# Patient Record
Sex: FEMALE | Race: WHITE | NOT HISPANIC OR LATINO | Employment: STUDENT | URBAN - NONMETROPOLITAN AREA
[De-identification: names, ages, dates, MRNs, and addresses within clinical notes are randomized per-mention and may not be internally consistent; named-entity substitution may affect disease eponyms.]

---
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
## 2024-11-11 ENCOUNTER — OFFICE VISIT (OUTPATIENT)
Dept: UROLOGY | Facility: CLINIC | Age: 19
End: 2024-11-11
Payer: COMMERCIAL

## 2024-11-11 VITALS
HEIGHT: 67 IN | SYSTOLIC BLOOD PRESSURE: 107 MMHG | BODY MASS INDEX: 30.76 KG/M2 | HEART RATE: 58 BPM | WEIGHT: 196 LBS | DIASTOLIC BLOOD PRESSURE: 67 MMHG

## 2024-11-11 DIAGNOSIS — R35.0 FREQUENCY OF MICTURITION: Primary | ICD-10-CM

## 2024-11-11 DIAGNOSIS — N30.20 CHRONIC CYSTITIS: ICD-10-CM

## 2024-11-11 LAB
BILIRUB BLD-MCNC: NEGATIVE MG/DL
CLARITY, POC: CLEAR
COLOR UR: YELLOW
EXPIRATION DATE: ABNORMAL
GLUCOSE UR STRIP-MCNC: NEGATIVE MG/DL
KETONES UR QL: NEGATIVE
LEUKOCYTE EST, POC: ABNORMAL
Lab: ABNORMAL
NITRITE UR-MCNC: NEGATIVE MG/ML
PH UR: 6 [PH] (ref 5–8)
PROT UR STRIP-MCNC: ABNORMAL MG/DL
RBC # UR STRIP: NEGATIVE /UL
SP GR UR: 1.02 (ref 1–1.03)
UROBILINOGEN UR QL: NORMAL

## 2024-11-11 PROCEDURE — 87086 URINE CULTURE/COLONY COUNT: CPT | Performed by: UROLOGY

## 2024-11-11 PROCEDURE — 99203 OFFICE O/P NEW LOW 30 MIN: CPT | Performed by: UROLOGY

## 2024-11-11 PROCEDURE — 81003 URINALYSIS AUTO W/O SCOPE: CPT | Performed by: UROLOGY

## 2024-11-11 RX ORDER — ARIPIPRAZOLE 2 MG/1
1 TABLET ORAL DAILY
COMMUNITY
Start: 2024-11-07

## 2024-11-11 RX ORDER — POLYETHYLENE GLYCOL 3350 17 G/17G
POWDER, FOR SOLUTION ORAL
COMMUNITY
Start: 2024-07-25

## 2024-11-11 RX ORDER — OXCARBAZEPINE 150 MG/1
1 TABLET, FILM COATED ORAL EVERY 12 HOURS SCHEDULED
COMMUNITY
Start: 2024-09-26

## 2024-11-11 NOTE — PROGRESS NOTES
Chief Complaint:      Chief Complaint   Patient presents with    bladder wall thickening        HPI:   19 y.o. female furred for evaluation of bladder wall thickening.  She has a positive urine culture.  She has frequency and urgency.  No history of stones.  Apparently this x-ray was done at [facility] and I do not have access to the films set her up for lower tract investigation.    Past Medical History:     Past Medical History:   Diagnosis Date    Anxiety     Depression     PTSD (post-traumatic stress disorder)     Substance abuse     Suicide attempt        Current Meds:     Current Outpatient Medications   Medication Sig Dispense Refill    ARIPiprazole (ABILIFY) 2 MG tablet Take 1 tablet by mouth Daily.      buPROPion XL (WELLBUTRIN XL) 300 MG 24 hr tablet Take 1 tablet by mouth Every Morning. 30 tablet 0    mirtazapine (REMERON) 7.5 MG tablet Take 1 tablet by mouth Every Night. 30 tablet 0    ondansetron ODT (ZOFRAN-ODT) 4 MG disintegrating tablet Place 1 tablet on the tongue Every 6 (Six) Hours As Needed for Nausea or Vomiting. 12 tablet 0    OXcarbazepine (TRILEPTAL) 150 MG tablet Take 1 tablet by mouth Every 12 (Twelve) Hours.      polyethylene glycol (MIRALAX) 17 GM/SCOOP powder       prazosin (MINIPRESS) 2 MG capsule Take 1 capsule by mouth Every Night. 30 capsule 0    Vitamin D, Cholecalciferol, (CHOLECALCIFEROL) 10 MCG (400 UNIT) tablet Take 1 tablet by mouth Every Night.       No current facility-administered medications for this visit.        Allergies:      No Known Allergies     Past Surgical History:     Past Surgical History:   Procedure Laterality Date    TONSILLECTOMY         Social History:     Social History     Socioeconomic History    Marital status: Single   Tobacco Use    Smoking status: Every Day     Current packs/day: 1.50     Average packs/day: 1.5 packs/day for 5.0 years (7.5 ttl pk-yrs)     Types: Cigarettes     Passive exposure: Current    Smokeless tobacco: Never   Vaping Use    Vaping  status: Never Used   Substance and Sexual Activity    Alcohol use: Not Currently    Drug use: Yes     Types: Methamphetamines, Marijuana    Sexual activity: Yes     Partners: Male     Birth control/protection: Condom       Family History:     Family History   Problem Relation Age of Onset    Drug abuse Father     Drug abuse Paternal Uncle     Drug abuse Paternal Grandfather        Review of Systems:     Review of Systems   Constitutional: Negative.  Negative for activity change, appetite change, chills, diaphoresis, fatigue and unexpected weight change.   HENT:  Negative for congestion, dental problem, drooling, ear discharge, ear pain, facial swelling, hearing loss, mouth sores, nosebleeds, postnasal drip, rhinorrhea, sinus pressure, sneezing, sore throat, tinnitus, trouble swallowing and voice change.    Eyes: Negative.  Negative for photophobia, pain, discharge, redness, itching and visual disturbance.   Respiratory: Negative.  Negative for apnea, cough, choking, chest tightness, shortness of breath, wheezing and stridor.    Cardiovascular: Negative.  Negative for chest pain, palpitations and leg swelling.   Gastrointestinal: Negative.  Negative for abdominal distention, abdominal pain, anal bleeding, blood in stool, constipation, diarrhea, nausea, rectal pain and vomiting.   Endocrine: Negative.  Negative for cold intolerance, heat intolerance, polydipsia, polyphagia and polyuria.   Musculoskeletal: Negative.  Negative for arthralgias, back pain, gait problem, joint swelling, myalgias, neck pain and neck stiffness.   Skin: Negative.  Negative for color change, pallor, rash and wound.   Allergic/Immunologic: Negative.  Negative for environmental allergies, food allergies and immunocompromised state.   Neurological: Negative.  Negative for dizziness, tremors, seizures, syncope, facial asymmetry, speech difficulty, weakness, light-headedness, numbness and headaches.   Hematological: Negative.  Negative for  adenopathy. Does not bruise/bleed easily.   Psychiatric/Behavioral:  Negative for agitation, behavioral problems, confusion, decreased concentration, dysphoric mood, hallucinations, self-injury, sleep disturbance and suicidal ideas. The patient is not nervous/anxious and is not hyperactive.    All other systems reviewed and are negative.      Physical Exam:     Physical Exam  Constitutional:       Appearance: She is well-developed.   HENT:      Head: Normocephalic and atraumatic.      Right Ear: External ear normal.      Left Ear: External ear normal.   Eyes:      Conjunctiva/sclera: Conjunctivae normal.      Pupils: Pupils are equal, round, and reactive to light.   Cardiovascular:      Rate and Rhythm: Normal rate and regular rhythm.      Heart sounds: Normal heart sounds.   Pulmonary:      Effort: Pulmonary effort is normal.      Breath sounds: Normal breath sounds.   Abdominal:      General: Bowel sounds are normal. There is no distension.      Palpations: Abdomen is soft. There is no mass.      Tenderness: There is no abdominal tenderness. There is no guarding or rebound.   Genitourinary:     Vagina: No vaginal discharge.   Musculoskeletal:         General: Normal range of motion.   Skin:     General: Skin is warm and dry.   Neurological:      Mental Status: She is alert.      Deep Tendon Reflexes: Reflexes are normal and symmetric.   Psychiatric:         Behavior: Behavior normal.         Thought Content: Thought content normal.         Judgment: Judgment normal.         I have reviewed the following portions of the patient's history: Allergies, current medications, past family history, past medical history, past social history, past surgical history, problem list, and ROS and confirm it is accurate.    Recent Image (CT and/or KUB):      CT Abdomen and Pelvis: No results found for this or any previous visit.       CT Stone Protocol: No results found for this or any previous visit.       KUB: No results found  for this or any previous visit.       Labs (past 3 months):      No visits with results within 3 Month(s) from this visit.   Latest known visit with results is:   Admission on 10/08/2022, Discharged on 10/08/2022   Component Date Value Ref Range Status    Glucose 10/08/2022 105 (H)  65 - 99 mg/dL Final    BUN 10/08/2022 9  6 - 20 mg/dL Final    Creatinine 10/08/2022 0.96  0.57 - 1.00 mg/dL Final    Sodium 10/08/2022 141  136 - 145 mmol/L Final    Potassium 10/08/2022 3.9  3.5 - 5.2 mmol/L Final    Chloride 10/08/2022 105  98 - 107 mmol/L Final    CO2 10/08/2022 24.0  22.0 - 29.0 mmol/L Final    Calcium 10/08/2022 8.8  8.6 - 10.5 mg/dL Final    Total Protein 10/08/2022 7.2  6.0 - 8.5 g/dL Final    Albumin 10/08/2022 4.23  3.50 - 5.20 g/dL Final    ALT (SGPT) 10/08/2022 10  1 - 33 U/L Final    AST (SGOT) 10/08/2022 15  1 - 32 U/L Final    Alkaline Phosphatase 10/08/2022 96  43 - 101 U/L Final    Total Bilirubin 10/08/2022 0.2  0.0 - 1.2 mg/dL Final    Globulin 10/08/2022 3.0  gm/dL Final    A/G Ratio 10/08/2022 1.4  g/dL Final    BUN/Creatinine Ratio 10/08/2022 9.4  7.0 - 25.0 Final    Anion Gap 10/08/2022 12.0  5.0 - 15.0 mmol/L Final    eGFR 10/08/2022 88.1  >60.0 mL/min/1.73 Final    National Kidney Foundation and American Society of Nephrology (ASN) Task Force recommended calculation based on the Chronic Kidney Disease Epidemiology Collaboration (CKD-EPI) equation refit without adjustment for race.    Lipase 10/08/2022 35  13 - 60 U/L Final    Color, UA 10/08/2022 Yellow  Yellow, Straw Final    Appearance, UA 10/08/2022 Clear  Clear Final    pH, UA 10/08/2022 6.5  5.0 - 8.0 Final    Specific Gravity, UA 10/08/2022 1.006  1.005 - 1.030 Final    Glucose, UA 10/08/2022 Negative  Negative Final    Ketones, UA 10/08/2022 Negative  Negative Final    Bilirubin, UA 10/08/2022 Negative  Negative Final    Blood, UA 10/08/2022 Negative  Negative Final    Protein, UA 10/08/2022 Negative  Negative Final    Leuk Esterase, UA  10/08/2022 Trace (A)  Negative Final    Nitrite, UA 10/08/2022 Negative  Negative Final    Urobilinogen, UA 10/08/2022 0.2 E.U./dL  0.2 - 1.0 E.U./dL Final    COVID19 10/08/2022 Not Detected  Not Detected - Ref. Range Final    Influenza A PCR 10/08/2022 Not Detected  Not Detected Final    Influenza B PCR 10/08/2022 Not Detected  Not Detected Final    WBC 10/08/2022 11.92 (H)  3.40 - 10.80 10*3/mm3 Final    RBC 10/08/2022 4.70  3.77 - 5.28 10*6/mm3 Final    Hemoglobin 10/08/2022 13.6  12.0 - 15.9 g/dL Final    Hematocrit 10/08/2022 41.2  34.0 - 46.6 % Final    MCV 10/08/2022 87.7  79.0 - 97.0 fL Final    MCH 10/08/2022 28.9  26.6 - 33.0 pg Final    MCHC 10/08/2022 33.0  31.5 - 35.7 g/dL Final    RDW 10/08/2022 14.2  12.3 - 15.4 % Final    RDW-SD 10/08/2022 45.5  37.0 - 54.0 fl Final    MPV 10/08/2022 10.6  6.0 - 12.0 fL Final    Platelets 10/08/2022 337  140 - 450 10*3/mm3 Final    Neutrophil % 10/08/2022 58.2  42.7 - 76.0 % Final    Lymphocyte % 10/08/2022 31.7  19.6 - 45.3 % Final    Monocyte % 10/08/2022 8.0  5.0 - 12.0 % Final    Eosinophil % 10/08/2022 1.4  0.3 - 6.2 % Final    Basophil % 10/08/2022 0.4  0.0 - 1.5 % Final    Immature Grans % 10/08/2022 0.3  0.0 - 0.5 % Final    Neutrophils, Absolute 10/08/2022 6.93  1.70 - 7.00 10*3/mm3 Final    Lymphocytes, Absolute 10/08/2022 3.78 (H)  0.70 - 3.10 10*3/mm3 Final    Monocytes, Absolute 10/08/2022 0.95 (H)  0.10 - 0.90 10*3/mm3 Final    Eosinophils, Absolute 10/08/2022 0.17  0.00 - 0.40 10*3/mm3 Final    Basophils, Absolute 10/08/2022 0.05  0.00 - 0.20 10*3/mm3 Final    Immature Grans, Absolute 10/08/2022 0.04  0.00 - 0.05 10*3/mm3 Final    nRBC 10/08/2022 0.0  0.0 - 0.2 /100 WBC Final    Extra Tube 10/08/2022 Hold for add-ons.   Final    Auto resulted.    Extra Tube 10/08/2022 hold for add-on   Final    Auto resulted    Extra Tube 10/08/2022 Hold for add-ons.   Final    Auto resulted.    Extra Tube 10/08/2022 Hold for add-ons.   Final    Auto resulted     Campylobacter 10/08/2022 Not Detected  Not Detected Final    Plesiomonas shigelloides 10/08/2022 Not Detected  Not Detected Final    Salmonella 10/08/2022 Not Detected  Not Detected Final    Vibrio 10/08/2022 Not Detected  Not Detected Final    Vibrio cholerae 10/08/2022 Not Detected  Not Detected Final    Yersinia enterocolitica 10/08/2022 Not Detected  Not Detected Final    Enteroaggregative E. coli (EAEC) 10/08/2022 Not Detected  Not Detected Final    Enteropathogenic E. coli (EPEC) 10/08/2022 Not Detected  Not Detected Final    Enterotoxigenic E. coli (ETEC) lt/* 10/08/2022 Not Detected  Not Detected Final    Shiga-like toxin-producing E. coli* 10/08/2022 Not Detected  Not Detected Final    Shigella/Enteroinvasive E. coli (E* 10/08/2022 Not Detected  Not Detected Final    Cryptosporidium 10/08/2022 Not Detected  Not Detected Final    Cyclospora cayetanensis 10/08/2022 Not Detected  Not Detected Final    Entamoeba histolytica 10/08/2022 Not Detected  Not Detected Final    Giardia lamblia 10/08/2022 Not Detected  Not Detected Final    Adenovirus F40/41 10/08/2022 Not Detected  Not Detected Final    Astrovirus 10/08/2022 Detected (A)  Not Detected Final    Norovirus GI/GII 10/08/2022 Not Detected  Not Detected Final    Rotavirus A 10/08/2022 Not Detected  Not Detected Final    Sapovirus (I, II, IV or V) 10/08/2022 Not Detected  Not Detected Final    Toxigenic C. difficile by PCR 10/08/2022 Negative  Negative Final    027 Toxin 10/08/2022 Presumptive Negative   Final    RBC, UA 10/08/2022 0-2  None Seen, 0-2 /HPF Final    WBC, UA 10/08/2022 6-12 (A)  None Seen, 0-2 /HPF Final    Bacteria, UA 10/08/2022 1+ (A)  None Seen /HPF Final    Squamous Epithelial Cells, UA 10/08/2022 3-6 (A)  None Seen, 0-2 /HPF Final    Hyaline Casts, UA 10/08/2022 None Seen  None Seen /LPF Final    Methodology 10/08/2022 Automated Microscopy   Final    HCG, Urine QL 10/08/2022 Negative  Negative Final        Procedure:        Assessment/Plan:   Renal calculus-we discussed the presence of the stone. We discussed the various therapeutic options available including percutaneous nephrostolithotomy, ureteroscopy and extracorporeal shockwave  lithotripsy.  We discussed the risks of lithotripsy including the passage of stones, the development of a large string of stones in the distal ureter known as Steinstrasse.  In the 3% incidence of that we will need to proceed with a ureteroscopy for obstructing fragments.  Extremely rare incidence of renal hematoma and the significance of this.  We discussed percutaneous nephrostolithotomy and its use as well as the risks and benefits such as the need for postoperative hospitalization, and the risk of damage to the kidney and the remote risk of a nephrectomy.  We also discussed the use of ureteroscopy in the upper tracts.  That this is as a decreased success rate to completely remove the stones on the first option and that very likely a stent will be required requiring an additional procedure for removal.  We discussed the absolute relative indicators for intervention including the presence of sepsis and pain we cannot control ais the primary need for urgent intervention.  We discussed placement of a stent if indicated and the management of the stent as well.  Has a small 2 mm stone seen on 2 different CTs'  Chronic cystitis she has a diffusely thickened urinary bladder she needs cystoscopy.  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  The mainstay of treatment has been tight cholinergics which basically cause the bladder to have decreased contractility.  We have discussed the side effects of these treatments including dry mouth, double vision, and increasing  constipation.  IBS-patient has significant irritable bowel syndrome characterized by extreme amounts of constipation.  We spoke about the impact of this on bladder function and its relationship to recurrent urinary tract infections.  We discussed the need for increasing fluid and discussed the physiology of colonic motility as well as use of MiraLAX as a bulk laxative versus the newer class of serotonin uptake blockers such as Linzess.  We stressed the need for a daily bowel movement and discussed the [place] stool scale at length.  We also discussed a referral to the GI nurse practitioner.  She is on Linzess              This document has been electronically signed by MARISOL WALL MD November 11, 2024 14:51 EST    Dictated Utilizing Dragon Dictation: Part of this note may be an electronic transcription/translation of spoken language to printed text using the Dragon Dictation System.

## 2024-11-13 PROBLEM — N30.20 CHRONIC CYSTITIS: Status: ACTIVE | Noted: 2024-11-13

## 2024-11-13 LAB — BACTERIA SPEC AEROBE CULT: NO GROWTH
